# Patient Record
Sex: FEMALE
[De-identification: names, ages, dates, MRNs, and addresses within clinical notes are randomized per-mention and may not be internally consistent; named-entity substitution may affect disease eponyms.]

---

## 2023-07-18 ENCOUNTER — APPOINTMENT (OUTPATIENT)
Dept: OBGYN | Facility: CLINIC | Age: 43
End: 2023-07-18
Payer: COMMERCIAL

## 2023-07-18 DIAGNOSIS — Z12.39 ENCOUNTER FOR OTHER SCREENING FOR MALIGNANT NEOPLASM OF BREAST: ICD-10-CM

## 2023-07-18 PROBLEM — Z00.00 ENCOUNTER FOR PREVENTIVE HEALTH EXAMINATION: Status: ACTIVE | Noted: 2023-07-18

## 2023-07-18 PROCEDURE — 99203 OFFICE O/P NEW LOW 30 MIN: CPT

## 2023-07-18 NOTE — HISTORY OF PRESENT ILLNESS
[FreeTextEntry1] : ,RUDI 44yo Female G0 who presents to establish care. \par \par Presents to the office for Consult on Nodule on R breast. Got her screening MMG at [facility] with BIRADS 0 and needs dx MMG of R breast and bilateral breast sono d/t dense breasts.\par Pt had her annual this year and just needs a referral for imaging, recently changed insurances\par Pap UTD and WNL\par Hx of uterine polypectomy, had intermenstrual spotting\par \par

## 2023-07-18 NOTE — PLAN
[FreeTextEntry1] : ,RUDI 44yo Female G0 who presents to establish care. \par \par - Reviewed breast imaging guidelines and previous BIRADS 0\par - Rx for dx R breast MMG and bilateral breast sonogram\par - Reviewed dense breasts common finding\par \par RTO for WWE or PRN.\par \par Jaja Hernandez MD

## 2023-07-18 NOTE — PHYSICAL EXAM
[Chaperone Present] : A chaperone was present in the examining room during all aspects of the physical examination [Appropriately responsive] : appropriately responsive [Alert] : alert [No Acute Distress] : no acute distress [Regular Rate Rhythm] : regular rate rhythm [No Murmurs] : no murmurs [Clear to Auscultation B/L] : clear to auscultation bilaterally [Soft] : soft [Non-tender] : non-tender [Non-distended] : non-distended [No Mass] : no mass [Oriented x3] : oriented x3 [Examination Of The Breasts] : a normal appearance [Normal] : normal [No Masses] : no breast masses were palpable

## 2023-08-28 DIAGNOSIS — R92.8 OTHER ABNORMAL AND INCONCLUSIVE FINDINGS ON DIAGNOSTIC IMAGING OF BREAST: ICD-10-CM

## 2025-06-12 ENCOUNTER — RESULT REVIEW (OUTPATIENT)
Age: 45
End: 2025-06-12